# Patient Record
Sex: FEMALE | Race: AMERICAN INDIAN OR ALASKA NATIVE
[De-identification: names, ages, dates, MRNs, and addresses within clinical notes are randomized per-mention and may not be internally consistent; named-entity substitution may affect disease eponyms.]

---

## 2017-05-30 NOTE — MAMMOGRAPHY REPORT
Bilateral mammogram: No previous studies available.



Findings:



Predominance adipose tissue bilaterally. No mass or microcalcification. 

Focal asymmetry right breast. Benign axillary nodes.



Impression:



Focal asymmetry right breast. Comparison with previous studies is 

recommended. If previous studies are not available spot mag and 

sonographic examination advised. BI-RADS CATEGORY:  0 = Needs 

additional imaging evaluation



ACR BI-RADS MAMMOGRAPHIC CODES:

0 = Needs additional imaging evaluation; 1 = Negative; 2 = Benign; 3 = 

Probably benign; 4 = Suspicious; 5 = Malignant; 6 = Known biopsy-proven 

malignancy



COMMENT:

      1.   Dense breast tissue, i.e., adenosis, fibrocystic    

            changes, etc., may obscure an underlying neoplasm.

      2.   Approximately 10% of cancers are not detected with

            mammography.

      3.   A negative mammography report should not delay biopsy 

            if a clinically suspicious mass is present. COMMENT:

Patient follow-up letters are generated in [employer].

## 2017-06-12 NOTE — ULTRASOUND REPORT
RIGHT DIGITAL DIAGNOSTIC MAMMOGRAM and RIGHT BREAST ULTRASOUND: 06/12/17



CLINICAL: Recalled for asymmetry.



COMPARISON:05/30/17screening



FINDINGS: Lateralmedial and spot magnification LM, MLOand CC views were 

performed.  Asymmetry with architectural distortion persists on the CC 

view and there appears to be correlation on lateral medial views.





Ultrasound of the right breast (including all four quadrants and the 

retroareolar area) was performed and demonstrated normal primarily 

fatty structures.  No mass  or shadowing to correlate with the 

mammographic asymmetry.   No cyst.



IMPRESSION: Probably benign  asymmetry and architectural distortion 

with a negative ultrasound .



BI-RADS CATEGORY:  3 -- Probably Benign



RECOMMENDATION: 6 month followup right mammogram and right breast 

ultrasound if needed.



ACR BI-RADS MAMMOGRAPHIC CODES:

0 = Needs additional imaging evaluation; 1 = Negative; 2 = Benign; 3 = 

Probably benign; 4 = Suspicious; 5 = Malignant; 6 = Known biopsy-proven 

malignancy



COMMENT:

      1.   Dense breast tissue, i.e., adenosis, fibrocystic 

            changes, etc., may obscure an underlying neoplasm.

      2.   Approximately 10% of cancers are not detected with

            mammography.

      3.   A negative mammography report should not delay biopsy 

            if a clinically suspicious mass is present.





COMMENT:

Patient follow-up letters are generated via our [employer] application.

## 2017-06-12 NOTE — MAMMOGRAPHY REPORT
RIGHT DIGITAL DIAGNOSTIC MAMMOGRAM and RIGHT BREAST ULTRASOUND: 06/12/17



CLINICAL: Recalled for asymmetry.



COMPARISON:05/30/17screening



FINDINGS: Lateralmedial and spot magnification LM, MLOand CC views were 

performed.  Asymmetry with architectural distortion persists on the CC 

view and there appears to be correlation on lateral medial views.





Ultrasound of the right breast (including all four quadrants and the 

retroareolar area) was performed and demonstrated normal primarily 

fatty structures.  No mass  or shadowing to correlate with the 

mammographic asymmetry.   No cyst.



IMPRESSION: Probably benign  asymmetry and architectural distortion 

with a negative ultrasound .



BI-RADS CATEGORY:  3 -- Probably Benign



RECOMMENDATION: 6 month followup right mammogram and right breast 

ultrasound if needed.



ACR BI-RADS MAMMOGRAPHIC CODES:

0 = Needs additional imaging evaluation; 1 = Negative; 2 = Benign; 3 = 

Probably benign; 4 = Suspicious; 5 = Malignant; 6 = Known biopsy-proven 

malignancy



COMMENT:

      1.   Dense breast tissue, i.e., adenosis, fibrocystic 

            changes, etc., may obscure an underlying neoplasm.

      2.   Approximately 10% of cancers are not detected with

            mammography.

      3.   A negative mammography report should not delay biopsy 

            if a clinically suspicious mass is present.





COMMENT:

Patient follow-up letters are generated via our [employer] application.

## 2017-12-08 NOTE — MAMMOGRAPHY REPORT
RIGHT DIGITAL DIAGNOSTIC MAMMOGRAM with CAD: 12/08/17 08:10:00



CLINICAL: Follow-up asymmetry and architectural distortion..



COMPARISON:05/30/17



FINDINGS: The previously described architectural distortion and 

asymmetry in the inner breast is stable.  There is no correlation on 

MLO or lateral views.   







IMPRESSION: Stable probably benign asymmetry.



BI-RADS CATEGORY:  3 - - Probably Benign



RECOMMENDATION: Six month followup.  She will be due for a bilateral 

mammogram in six months.



ACR BI-RADS MAMMOGRAPHIC CODES:

0 = Needs additional imaging evaluation; 1 = Negative; 2 = Benign; 3 = 

Probably benign; 4 = Suspicious; 5 = Malignant; 6 = Known biopsy-proven 

malignancy



COMMENT:

      1.   Dense breast tissue, i.e., adenosis, fibrocystic 

            changes, etc., may obscure an underlying neoplasm.

      2.   Approximately 10% of cancers are not detected with

            mammography.

      3.   A negative mammography report should not delay biopsy 

            if a clinically suspicious mass is present.





COMMENT:

Patient follow-up letters are generated by our  [employer] application.

## 2018-05-31 ENCOUNTER — HOSPITAL ENCOUNTER (OUTPATIENT)
Dept: HOSPITAL 5 - MAMMO | Age: 38
Discharge: HOME | End: 2018-05-31
Attending: OBSTETRICS & GYNECOLOGY
Payer: COMMERCIAL

## 2018-05-31 DIAGNOSIS — Z12.31: Primary | ICD-10-CM

## 2018-05-31 PROCEDURE — 77067 SCR MAMMO BI INCL CAD: CPT

## 2018-05-31 NOTE — MAMMOGRAPHY REPORT
BILATERAL DIGITAL SCREENING MAMMOGRAM with CAD: 05/31/18 07:29:00



CLINICAL: Routine screening.



COMPARISON:05/30/17 and 06/12/17 and 12/08/17 right mammograms.



FINDINGS: The breasts are almost entirely fatty.The previously 

described right asymmetry with mild architectural distortion on the CC 

view is unchanged compared to previous exams. No mass, suspicious 

architectural distortion or suspicious calcifications.



IMPRESSION: No mammographic evidence of malignancy.  Benign right 

asymmetry and architectural distortion.



BI-RADS CATEGORY:  2 -- Benign



RECOMMENDATION: Routine mammographic screening in one year.





COMMENT:

Patient follow-up letters are generated by our [employer] application.

## 2019-06-10 ENCOUNTER — HOSPITAL ENCOUNTER (OUTPATIENT)
Dept: HOSPITAL 5 - MAMMO | Age: 39
Discharge: HOME | End: 2019-06-10
Attending: OBSTETRICS & GYNECOLOGY
Payer: COMMERCIAL

## 2019-06-10 DIAGNOSIS — Z12.31: Primary | ICD-10-CM

## 2019-06-10 PROCEDURE — 77067 SCR MAMMO BI INCL CAD: CPT

## 2019-06-10 NOTE — MAMMOGRAPHY REPORT
BILATERAL DIGITAL SCREENING MAMMOGRAM with CAD: 06/10/19 10:11:00



CLINICAL: Routine screening.



COMPARISON:05/31/18



FINDINGS: The breasts are almost entirely fatty. No mass, architectural 

distortion or suspicious calcifications.



IMPRESSION: No mammographic evidence of malignancy.



BI-RADS CATEGORY: 1 - - Negative



RECOMMENDATION: Routine mammographic screening in one year.





COMMENT:

Patient follow-up letters are generated by our [employer] application.

## 2020-07-22 ENCOUNTER — HOSPITAL ENCOUNTER (OUTPATIENT)
Dept: HOSPITAL 5 - MAMMO | Age: 40
Discharge: HOME | End: 2020-07-22
Attending: OBSTETRICS & GYNECOLOGY
Payer: COMMERCIAL

## 2020-07-22 DIAGNOSIS — Z12.31: Primary | ICD-10-CM

## 2020-07-22 DIAGNOSIS — N64.89: ICD-10-CM

## 2020-07-22 PROCEDURE — 77067 SCR MAMMO BI INCL CAD: CPT

## 2020-07-22 NOTE — MAMMOGRAPHY REPORT
DIGITAL SCREENING MAMMOGRAM WITH CAD, 7/22/2020



INDICATION: Routine screening mammography. 



TECHNIQUE:  Digital bilateral  2D mammography was obtained in the craniocaudal and mediolateral obliq
ue projections. This examination was interpreted with the benefit of Computer-Aided Detection analysi
s.



COMPARISON: 6/10/2019, 5/31/2018



FINDINGS: 



Breast Density: There are scattered areas of fibroglandular density.



There is no evidence of dominant mass, suspicious calcifications or architectural distortion in eithe
r breast.



IMPRESSION:



Follow up recommendation: Routine yearly



BI-RADS Category 1:  Negative.



A "normal" or negative report should not discourage follow up or biopsy of a clinically significant f
inding.



A written summary of these findings will be mailed to the patient. The patient will be entered into a
 mammography reporting system which will generate a reminder letter for the patient's next appointmen
t at the appropriate interval.



The [employer] College [employer] recommends yearly mammograms starting at age 40 and continuing as l
andrea as a woman is in good health.  Breast MRI is recommended for women with an approximate 20-25% or 
greater lifetime risk of breast cancer, including women with a strong family history of breast or ova
tarsha cancer or who have been treated for Hodgkin's disease.



Signer Name: Chalino Rivas MD 

Signed: 7/22/2020 1:50 PM

Workstation Name: [employer]